# Patient Record
Sex: MALE | Race: WHITE | NOT HISPANIC OR LATINO | Employment: FULL TIME | URBAN - METROPOLITAN AREA
[De-identification: names, ages, dates, MRNs, and addresses within clinical notes are randomized per-mention and may not be internally consistent; named-entity substitution may affect disease eponyms.]

---

## 2018-02-02 ENCOUNTER — OFFICE VISIT (OUTPATIENT)
Dept: URGENT CARE | Facility: CLINIC | Age: 58
End: 2018-02-02
Payer: COMMERCIAL

## 2018-02-02 ENCOUNTER — APPOINTMENT (OUTPATIENT)
Dept: RADIOLOGY | Facility: IMAGING CENTER | Age: 58
End: 2018-02-02
Attending: FAMILY MEDICINE
Payer: COMMERCIAL

## 2018-02-02 VITALS
WEIGHT: 199 LBS | RESPIRATION RATE: 14 BRPM | SYSTOLIC BLOOD PRESSURE: 128 MMHG | HEART RATE: 73 BPM | DIASTOLIC BLOOD PRESSURE: 88 MMHG | HEIGHT: 74 IN | TEMPERATURE: 98.4 F | BODY MASS INDEX: 25.54 KG/M2 | OXYGEN SATURATION: 98 %

## 2018-02-02 DIAGNOSIS — M25.562 LEFT KNEE PAIN, UNSPECIFIED CHRONICITY: ICD-10-CM

## 2018-02-02 DIAGNOSIS — R20.0 BILATERAL HAND NUMBNESS: ICD-10-CM

## 2018-02-02 DIAGNOSIS — Z87.19 H/O LEFT INGUINAL HERNIA REPAIR: ICD-10-CM

## 2018-02-02 DIAGNOSIS — M54.12 CERVICAL RADICULITIS: ICD-10-CM

## 2018-02-02 DIAGNOSIS — M17.12 OSTEOARTHRITIS OF LEFT KNEE, UNSPECIFIED OSTEOARTHRITIS TYPE: ICD-10-CM

## 2018-02-02 DIAGNOSIS — R10.31 RIGHT INGUINAL PAIN: ICD-10-CM

## 2018-02-02 DIAGNOSIS — Z98.890 H/O LEFT INGUINAL HERNIA REPAIR: ICD-10-CM

## 2018-02-02 PROCEDURE — 73564 X-RAY EXAM KNEE 4 OR MORE: CPT | Mod: TC,FY,LT | Performed by: FAMILY MEDICINE

## 2018-02-02 PROCEDURE — 99204 OFFICE O/P NEW MOD 45 MIN: CPT | Performed by: FAMILY MEDICINE

## 2018-02-02 PROCEDURE — 72040 X-RAY EXAM NECK SPINE 2-3 VW: CPT | Mod: TC,FY | Performed by: FAMILY MEDICINE

## 2018-02-02 RX ORDER — ASPIRIN 81 MG/1
81 TABLET, CHEWABLE ORAL DAILY
COMMUNITY

## 2018-02-02 RX ORDER — METHYLPREDNISOLONE 4 MG/1
TABLET ORAL
Qty: 21 TAB | Refills: 0 | Status: SHIPPED | OUTPATIENT
Start: 2018-02-02

## 2018-02-02 ASSESSMENT — ENCOUNTER SYMPTOMS
BRUISES/BLEEDS EASILY: 0
VOMITING: 0
SENSORY CHANGE: 1
ABDOMINAL PAIN: 0
DIARRHEA: 0
BLOOD IN STOOL: 0
HEARTBURN: 0
CHILLS: 0
TINGLING: 1
WEAKNESS: 0
RESPIRATORY NEGATIVE: 1
CARDIOVASCULAR NEGATIVE: 1
NAUSEA: 0
PSYCHIATRIC NEGATIVE: 1
FEVER: 0
EYES NEGATIVE: 1
CONSTIPATION: 0

## 2018-02-02 ASSESSMENT — PATIENT HEALTH QUESTIONNAIRE - PHQ9: CLINICAL INTERPRETATION OF PHQ2 SCORE: 0

## 2018-02-02 ASSESSMENT — PAIN SCALES - GENERAL: PAINLEVEL: 3=SLIGHT PAIN

## 2018-02-02 NOTE — PROGRESS NOTES
Subjective:      Joshua Curran is a 57 y.o. male who presents with Numbness (in both hands off and on. started this Monday.); Knee Pain; and Possible Hernia (lower abdomen, pain started at the begining of the year)    1. Patient presents to urgent care with multiple complaints. Firstly he states that he's been having numbness and tingling mostly of his left lateral palmar hand over the past 5 days this is a new problem. He has noticed it somewhat in his right hand as well. Denies any injury to his upper extremities or his neck. Denies any history of any neck pain. Denies any fevers or chills no skin rashes. He has not tried any medications for this at this point. It does get better with some rest    2. Patient also states that he's noticed some increased pain to his left knee with climbing stairs this is been present for the past 2 weeks. This is a new problem. Patient does have a history of knee surgery about 20 years ago in this knee. He states that he's been more physically active in his job right now no single injury of note. But wondered if just age and physical activity was wearing him down.    3. Patient also has a prior history of left inguinal hernia that was repaired in 2005. He has over the past week he noticed some right inguinal region pain that was similar in character to that left. Denies any blood in the stool denies any constipation. Again no fevers or chills. No dysuria or hematuria. Denies any testicular pain or swelling. He notes a fullness he feels in his right inguianal region. No n,v,d.       HPI  Review of Systems   Constitutional: Negative for chills, fever and malaise/fatigue.   HENT: Negative.    Eyes: Negative.    Respiratory: Negative.    Cardiovascular: Negative.    Gastrointestinal: Negative for abdominal pain, blood in stool, constipation, diarrhea, heartburn, melena, nausea and vomiting.   Genitourinary: Negative.    Musculoskeletal: Positive for joint pain.   Skin: Negative.     "  Neurological: Positive for tingling and sensory change. Negative for weakness.   Endo/Heme/Allergies: Does not bruise/bleed easily.   Psychiatric/Behavioral: Negative.      PMH:  has no past medical history on file.  MEDS:   Current Outpatient Prescriptions:   •  aspirin (ASA) 81 MG Chew Tab chewable tablet, Take 81 mg by mouth every day., Disp: , Rfl:   ALLERGIES: No Known Allergies  SURGHX: No past surgical history on file.  SOCHX:  reports that he has been smoking Cigarettes.  He has a 15.00 pack-year smoking history. He has never used smokeless tobacco. He reports that he drinks about 16.8 oz of alcohol per week . He reports that he uses drugs, including Marijuana.  FH: Family history was reviewed, no pertinent findings to report     Objective:     /88   Pulse 73   Temp 36.9 °C (98.4 °F)   Resp 14   Ht 1.88 m (6' 2\")   Wt 90.3 kg (199 lb)   SpO2 98%   BMI 25.55 kg/m²      Physical Exam   Constitutional: He is oriented to person, place, and time. He appears well-developed and well-nourished. No distress.   HENT:   Mouth/Throat: Oropharynx is clear and moist.   Eyes: Conjunctivae are normal.   Neck: Normal range of motion.   Cardiovascular: Normal rate, regular rhythm, normal heart sounds and intact distal pulses.  Exam reveals no gallop and no friction rub.    No murmur heard.  Pulmonary/Chest: Effort normal and breath sounds normal. No respiratory distress. He has no wheezes. He has no rales. He exhibits no tenderness.   Abdominal: Soft. Bowel sounds are normal. He exhibits no distension and no mass. There is tenderness. There is no rebound and no guarding.   Musculoskeletal: Normal range of motion. He exhibits no edema, tenderness or deformity.        Left knee: He exhibits normal range of motion, no swelling, no effusion, normal alignment and no bony tenderness. No tenderness found.        Cervical back: He exhibits normal range of motion, no tenderness, no bony tenderness, no swelling, no " edema, no deformity, no laceration, no pain, no spasm and normal pulse.        Legs:  Not tender to touch, pain deep inside when patient climbs stairs. Stable to examination      tinels positive bilaterally, left latearl hand palmar surface with baseline numbness to soft touch, motor strength intact and rom bilateral hands,  strength 5/5,    Neurological: He is alert and oriented to person, place, and time.   Skin: Skin is warm and dry. No rash noted. He is not diaphoretic. No erythema. No pallor.   Psychiatric: He has a normal mood and affect. His behavior is normal. Judgment and thought content normal.     Diagnostics: xray knee left with mild degenerative changes no acute fx or dislocation per my review                       Xray c spine degnerative changes in c spine noted without acute fx or dislocation per my review                       Ct abd pelvis   Knee:  1. Severe osteoarthritis of the medial compartment.   Reading Provider Reading Date   Erick Wood M.D. Feb 2, 2018     Cervical:  1.  Mild multilevel degenerative change from C4-5 through C7-T1.    2.  No acute compression fracture or subluxation.   Reading Provider Reading Date   Amadou Villarreal M.D. Feb 2, 2018         Assessment/Plan:     1. Bilateral hand numbness  DX-CERVICAL SPINE-2 OR 3 VIEWS    MethylPREDNISolone (MEDROL DOSEPAK) 4 MG Tablet Therapy Pack   2. Left knee pain, unspecified chronicity  DX-KNEE COMPLETE 4+ LEFT   3. H/O left inguinal hernia repair  CT-ABDOMEN-PELVIS WITH   4. Right inguinal pain  CT-ABDOMEN-PELVIS WITH   5. Cervical radiculitis  REFERRAL TO SPORTS MEDICINE   6. Osteoarthritis of left knee, unspecified osteoarthritis type  REFERRAL TO SPORTS MEDICINE     No show for CT abdomen and pelvis  Will contact patient to see what happened on next shift    Tylenol   Medrol dose pack for radiculitis and knee arthritis

## 2018-02-03 ENCOUNTER — TELEPHONE (OUTPATIENT)
Dept: URGENT CARE | Facility: CLINIC | Age: 58
End: 2018-02-03

## 2018-02-03 NOTE — TELEPHONE ENCOUNTER
Please notify patient that his knee and c spine xray showed degenerative changes nothing acutely broken. I put in the sports medicine referral for him to f/u with Dr. Tate.  Please ask why he did not get his CT scan done?  Thanks,  Dr Moran

## 2018-02-06 ENCOUNTER — OFFICE VISIT (OUTPATIENT)
Dept: MEDICAL GROUP | Facility: CLINIC | Age: 58
End: 2018-02-06
Payer: COMMERCIAL

## 2018-02-06 VITALS
WEIGHT: 199 LBS | SYSTOLIC BLOOD PRESSURE: 126 MMHG | BODY MASS INDEX: 25.54 KG/M2 | TEMPERATURE: 97.8 F | OXYGEN SATURATION: 98 % | HEART RATE: 80 BPM | RESPIRATION RATE: 18 BRPM | HEIGHT: 74 IN | DIASTOLIC BLOOD PRESSURE: 86 MMHG

## 2018-02-06 DIAGNOSIS — M17.12 PRIMARY OSTEOARTHRITIS OF LEFT KNEE: ICD-10-CM

## 2018-02-06 PROCEDURE — 20610 DRAIN/INJ JOINT/BURSA W/O US: CPT | Mod: LT | Performed by: FAMILY MEDICINE

## 2018-02-06 PROCEDURE — 99203 OFFICE O/P NEW LOW 30 MIN: CPT | Mod: 25 | Performed by: FAMILY MEDICINE

## 2018-02-06 RX ORDER — TRIAMCINOLONE ACETONIDE 40 MG/ML
40 INJECTION, SUSPENSION INTRA-ARTICULAR; INTRAMUSCULAR ONCE
Status: COMPLETED | OUTPATIENT
Start: 2018-02-06 | End: 2018-02-06

## 2018-02-06 RX ADMIN — TRIAMCINOLONE ACETONIDE 40 MG: 40 INJECTION, SUSPENSION INTRA-ARTICULAR; INTRAMUSCULAR at 18:00

## 2018-02-07 NOTE — PROGRESS NOTES
"CHIEF COMPLAINT:  Joshua Curran male presenting at the request of Tona Moran D.O. for evaluation of knee pain.     Joshua Curran is complaining of LEFT > right knee pain  present for 2 weeks  History of occasional soreness in the past  Pain is at the whole knee, BILATERALLY  Quality is sharp and achy  Pain is non-radiating   Improved with resting  Aggravated by standing, walking  previous knee issue with arthroscopy  Prior Treatments: Physical Therapy, back in 1997 after arthroscopy  Prior studies: X-Ray   Medications tried for pain include: naproxen (OTC), marijuana which helps some  Mechanical Symptom history: No Locking    REVIEW OF SYSTEMS  No Nausea, No Vomiting, No Shortness of Breath, Chest Pain, Dizziness, Headache  (pending work-up)    PAST MEDICAL HISTORY:   History reviewed. No pertinent past medical history.    PMH:  has no past medical history on file.  MEDS:   Current Outpatient Prescriptions:   •  aspirin (ASA) 81 MG Chew Tab chewable tablet, Take 81 mg by mouth every day., Disp: , Rfl:   •  MethylPREDNISolone (MEDROL DOSEPAK) 4 MG Tablet Therapy Pack, Take as directed with food, Disp: 21 Tab, Rfl: 0  ALLERGIES: No Known Allergies  SURGHX: No past surgical history on file.  SOCHX:  reports that he has been smoking Cigarettes.  He has a 15.00 pack-year smoking history. He has never used smokeless tobacco. He reports that he drinks about 16.8 oz of alcohol per week . He reports that he uses drugs, including Marijuana.  FH: Family history was reviewed, no pertinent findings to report     PHYSICAL EXAM:  /86   Pulse 80   Temp 36.6 °C (97.8 °F)   Resp 18   Ht 1.88 m (6' 2\")   Wt 90.3 kg (199 lb)   SpO2 98%   BMI 25.55 kg/m²       well-developed, well-nourished in no apparent distress, alert and oriented x 3.  Gait: antalgic     RIGHT Knee:  Slight Varus and No Swelling  Range of Motion Intact and Pain at extremes of motion  Trace effusion  Patellar No tenderness and no " apprehension  Medial Joint Line Tenderness and NEGATIVE [place]  Lateral Joint Line Non-tender and NEGATIVE [place]  Trace Laxity with Varus stress  Trace Laxity with Valgus stress  Lachman's testing is Trace  Posterior Drawer Testing is Trace  The leg is otherwise neurovascularly intact    LEFT Knee:  Slight Varus and No Swelling   Range of Motion Intact and Pain at extremes of motion  Trace effusion  Patellar No tenderness and no apprehension  Medial Joint Line Tenderness and POSITIVE [place]  Lateral Joint Line Tenderness and NEGATIVE [place]  Trace Laxity with Varus stress  Trace Laxity with Valgus stress  Lachman's testing is Trace  Posterior Drawer Testing is Trace  The leg is otherwise neurovascularly intact    Additional Findings: None      1. Primary osteoarthritis of left knee  triamcinolone acetonide (KENALOG-40) injection 40 mg     Osteoarthritis left knee  Corticosteroid injection of the LEFT knee performed in the office TODAY (February 6, 2018)    PROCEDURE NOTE:  left Knee corticosteroid injection  Risks and benefits discussed  Informed consent obtained  Knee prepped in sterile fashion utilizing a theo- inferior approach  40 mg of Kenalog and 5 cc of Marcaine injected into the knee joint space  Vapocoolant spray was utilized  Patient tolerated the procedure well  Postprocedure care and reflex discussed    Return in about 4 weeks (around 3/6/2018). Degenerated with his LEFT knee corticosteroid injection  If he desires a RIGHT knee corticosteroid injection we will have to obtain RIGHT knee series at that time                                              Results for orders placed in visit on 02/02/18   DX-KNEE COMPLETE 4+ LEFT    Impression 1. Severe osteoarthritis of the medial compartment.                                          done elsewhere and reviewed independently by me    Thank you Tona Moran D.O. for allowing me to participate in caring for your patient.

## 2018-02-07 NOTE — PATIENT INSTRUCTIONS
"Smoking Cessation, Tips for Success  If you are ready to quit smoking, congratulations! You have chosen to help yourself be healthier. Cigarettes bring nicotine, tar, carbon monoxide, and other irritants into your body. Your lungs, heart, and blood vessels will be able to work better without these poisons. There are many different ways to quit smoking. Nicotine gum, nicotine patches, a nicotine inhaler, or nicotine nasal spray can help with physical craving. Hypnosis, support groups, and medicines help break the habit of smoking.  WHAT THINGS CAN I DO TO MAKE QUITTING EASIER?   Here are some tips to help you quit for good:  · Pick a date when you will quit smoking completely. Tell all of your friends and family about your plan to quit on that date.  · Do not try to slowly cut down on the number of cigarettes you are smoking. Pick a quit date and quit smoking completely starting on that day.  · Throw away all cigarettes.    · Clean and remove all ashtrays from your home, work, and car.  · On a card, write down your reasons for quitting. Carry the card with you and read it when you get the urge to smoke.  · Cleanse your body of nicotine. Drink enough water and fluids to keep your urine clear or pale yellow. Do this after quitting to flush the nicotine from your body.  · Learn to predict your moods. Do not let a bad situation be your excuse to have a cigarette. Some situations in your life might tempt you into wanting a cigarette.  · Never have \"just one\" cigarette. It leads to wanting another and another. Remind yourself of your decision to quit.  · Change habits associated with smoking. If you smoked while driving or when feeling stressed, try other activities to replace smoking. Stand up when drinking your coffee. Brush your teeth after eating. Sit in a different chair when you read the paper. Avoid alcohol while trying to quit, and try to drink fewer caffeinated beverages. Alcohol and caffeine may urge you to " "smoke.  · Avoid foods and drinks that can trigger a desire to smoke, such as sugary or spicy foods and alcohol.  · Ask people who smoke not to smoke around you.  · Have something planned to do right after eating or having a cup of coffee. For example, plan to take a walk or exercise.  · Try a relaxation exercise to calm you down and decrease your stress. Remember, you may be tense and nervous for the first 2 weeks after you quit, but this will pass.  · Find new activities to keep your hands busy. Play with a pen, coin, or rubber band. Doodle or draw things on paper.  · Brush your teeth right after eating. This will help cut down on the craving for the taste of tobacco after meals. You can also try mouthwash.    · Use oral substitutes in place of cigarettes. Try using lemon drops, carrots, cinnamon sticks, or chewing gum. Keep them handy so they are available when you have the urge to smoke.  · When you have the urge to smoke, try deep breathing.  · Designate your home as a nonsmoking area.  · If you are a heavy smoker, ask your health care provider about a prescription for nicotine chewing gum. It can ease your withdrawal from nicotine.  · Reward yourself. Set aside the cigarette money you save and buy yourself something nice.  · Look for support from others. Join a support group or smoking cessation program. Ask someone at home or at work to help you with your plan to quit smoking.  · Always ask yourself, \"Do I need this cigarette or is this just a reflex?\" Tell yourself, \"Today, I choose not to smoke,\" or \"I do not want to smoke.\" You are reminding yourself of your decision to quit.  · Do not replace cigarette smoking with electronic cigarettes (commonly called e-cigarettes). The safety of e-cigarettes is unknown, and some may contain harmful chemicals.  · If you relapse, do not give up! Plan ahead and think about what you will do the next time you get the urge to smoke.  HOW WILL I FEEL WHEN I QUIT SMOKING?  You " may have symptoms of withdrawal because your body is used to nicotine (the addictive substance in cigarettes). You may crave cigarettes, be irritable, feel very hungry, cough often, get headaches, or have difficulty concentrating. The withdrawal symptoms are only temporary. They are strongest when you first quit but will go away within 10-14 days. When withdrawal symptoms occur, stay in control. Think about your reasons for quitting. Remind yourself that these are signs that your body is healing and getting used to being without cigarettes. Remember that withdrawal symptoms are easier to treat than the major diseases that smoking can cause.   Even after the withdrawal is over, expect periodic urges to smoke. However, these cravings are generally short lived and will go away whether you smoke or not. Do not smoke!  WHAT RESOURCES ARE AVAILABLE TO HELP ME QUIT SMOKING?  Your health care provider can direct you to community resources or hospitals for support, which may include:  · Group support.  · Education.  · Hypnosis.  · Therapy.     This information is not intended to replace advice given to you by your health care provider. Make sure you discuss any questions you have with your health care provider.     Document Released: 09/15/2005 Document Revised: 01/08/2016 Document Reviewed: 06/05/2014  [employer] Interactive Patient Education ©2016 [employer]